# Patient Record
Sex: MALE
[De-identification: names, ages, dates, MRNs, and addresses within clinical notes are randomized per-mention and may not be internally consistent; named-entity substitution may affect disease eponyms.]

---

## 2022-09-22 PROBLEM — Z00.00 ENCOUNTER FOR PREVENTIVE HEALTH EXAMINATION: Status: ACTIVE | Noted: 2022-09-22

## 2022-09-26 ENCOUNTER — APPOINTMENT (OUTPATIENT)
Dept: SURGERY | Facility: CLINIC | Age: 32
End: 2022-09-26

## 2022-09-26 VITALS
HEIGHT: 71 IN | TEMPERATURE: 97.1 F | OXYGEN SATURATION: 98 % | DIASTOLIC BLOOD PRESSURE: 72 MMHG | SYSTOLIC BLOOD PRESSURE: 121 MMHG | BODY MASS INDEX: 24.64 KG/M2 | WEIGHT: 176 LBS | HEART RATE: 53 BPM

## 2022-09-26 PROCEDURE — 99204 OFFICE O/P NEW MOD 45 MIN: CPT

## 2022-09-26 NOTE — ASSESSMENT
[FreeTextEntry1] : Mr. Booth is a 32-year-old man with a right inguinal hernia, possible left inguinal hernia, and umbilical hernia.  We will plan for a robotic-assisted right, possible bilateral inguinal hernia repair with mesh and umbilical hernia repair on October 27, 2022.

## 2022-09-26 NOTE — HISTORY OF PRESENT ILLNESS
[de-identified] : Mr. Booth presented today for evaluation and management of a right inguinal hernia.  He stated the hernia has been present for approximately 4 months.  He first noticed the hernia as a bulge after working out/lifting.  He denied significant pain of the hernia.  He stated the hernia is not significantly enlarging, although it seems to be popping out more frequently than it was when he first noticed it.

## 2022-09-26 NOTE — PHYSICAL EXAM
[Calm] : calm [de-identified] : NAD, comfortable [de-identified] : NCAT, no scleral icterus [de-identified] : +BS soft NT ND.  No hepatosplenomegaly.  Small umbilical hernia, reducible and non-tender. [de-identified] : Reducible right inguinal hernia.  Possible left inguinal hernia. [de-identified] : No clubbing, cyanosis, or edema. [de-identified] : Warm, dry. [de-identified] : A&Ox3

## 2022-09-26 NOTE — CONSULT LETTER
[FreeTextEntry1] : 2022\par \par \par \par Efra Saldaña M.D.\par [address]\par [place] [address]\par Telephone #: [phone]\par \par \par Re: Scotty Booth\par : 1990\par \par \par Dear Dr. Saldaña:\par \par I had the opportunity to see Mr. Booth today for evaluation and management of a right inguinal hernia.  He stated the hernia has been present for approximately 4 months.  He first noticed the hernia as a bulge after working out/lifting.  He denied significant pain of the hernia.  He stated the hernia is not significantly enlarging, although it seems to be popping out more frequently than it was when he first noticed it.\par \par On physical examination, his height is 5 feet 11 inches, his weight is 176 pounds, and BMI is 24.55.  His temperature is 97.1 °F, blood pressure is 121/72, heart rate is 53, and O2 saturation is 98% on room air.  In general, he is a well-dressed well-nourished man who appears his stated age and is in no acute distress.  He is calm, alert and oriented x3. HEENT exam demonstrates a normocephalic atraumatic appearance with no scleral icterus.  His abdomen has audible bowel sounds, is soft, non-tender, and non-distended. There is a small umbilical hernia that is reducible and non-tender.  His extremities are warm and dry without clubbing, cyanosis or edema.  Bilateral groin examination demonstrates a reducible right inguinal hernia, and a possible left inguinal hernia with Valsalva maneuver.\par \par In summary, Mr. Booth is a 32-year-old man with a right inguinal hernia, possible left inguinal hernia, and umbilical hernia.  We will plan for a robotic-assisted right, possible bilateral inguinal hernia repair with mesh and umbilical hernia repair on 2022.\par \par Thank you for the opportunity to care for this patient. Please do not hesitate to contact me in the event that you have any questions or concerns about the care of this patient.\par \par Sincerely,\par \par \par \par Debbie Franco M.D.

## 2022-09-26 NOTE — REASON FOR VISIT
[Consultation] : a consultation visit [FreeTextEntry1] : Consultation requested by: Dr. Efra Saldaña

## 2022-10-07 ENCOUNTER — OUTPATIENT (OUTPATIENT)
Dept: OUTPATIENT SERVICES | Facility: HOSPITAL | Age: 32
LOS: 1 days | End: 2022-10-07
Payer: COMMERCIAL

## 2022-10-07 DIAGNOSIS — Z01.818 ENCOUNTER FOR OTHER PREPROCEDURAL EXAMINATION: ICD-10-CM

## 2022-10-07 PROCEDURE — 71046 X-RAY EXAM CHEST 2 VIEWS: CPT | Mod: 26

## 2022-10-07 PROCEDURE — 93005 ELECTROCARDIOGRAM TRACING: CPT

## 2022-10-07 PROCEDURE — 93010 ELECTROCARDIOGRAM REPORT: CPT

## 2022-10-07 PROCEDURE — 71046 X-RAY EXAM CHEST 2 VIEWS: CPT

## 2022-10-11 ENCOUNTER — TRANSCRIPTION ENCOUNTER (OUTPATIENT)
Age: 32
End: 2022-10-11

## 2022-10-24 ENCOUNTER — LABORATORY RESULT (OUTPATIENT)
Age: 32
End: 2022-10-24

## 2022-10-26 ENCOUNTER — TRANSCRIPTION ENCOUNTER (OUTPATIENT)
Age: 32
End: 2022-10-26

## 2022-10-26 RX ORDER — ACETAMINOPHEN 500 MG
1000 TABLET ORAL ONCE
Refills: 0 | Status: COMPLETED | OUTPATIENT
Start: 2022-10-27 | End: 2022-10-27

## 2022-10-26 RX ORDER — APREPITANT 80 MG/1
40 CAPSULE ORAL ONCE
Refills: 0 | Status: COMPLETED | OUTPATIENT
Start: 2022-10-27 | End: 2022-10-27

## 2022-10-26 RX ORDER — CHLORHEXIDINE GLUCONATE 213 G/1000ML
1 SOLUTION TOPICAL DAILY
Refills: 0 | Status: DISCONTINUED | OUTPATIENT
Start: 2022-10-27 | End: 2022-10-27

## 2022-10-26 NOTE — ASU PATIENT PROFILE, ADULT - NS PREOP UNDERSTANDS INFO
No solid food, dairy, candy or gum after 9:30pm tonight, water is allowed before 04:30am tomorrow. Pt to bring photo ID/insurance card; no jewelries/contact lens/valuables; no smoking/alcohol consumption/drug use tonight. Escort must be 18yrs or older and must have a photo ID. Address and call back number given./yes

## 2022-10-26 NOTE — ASU PATIENT PROFILE, ADULT - PRO INTERPRETER NEED 2
English
[>50% of Time Spent on Counseling for ____] : Greater than 50% of the encounter time was spent on counseling for [unfilled]

## 2022-10-27 ENCOUNTER — APPOINTMENT (OUTPATIENT)
Dept: SURGERY | Facility: AMBULATORY SURGERY CENTER | Age: 32
End: 2022-10-27

## 2022-10-27 ENCOUNTER — TRANSCRIPTION ENCOUNTER (OUTPATIENT)
Age: 32
End: 2022-10-27

## 2022-10-27 ENCOUNTER — OUTPATIENT (OUTPATIENT)
Dept: OUTPATIENT SERVICES | Facility: HOSPITAL | Age: 32
LOS: 1 days | Discharge: HOME | End: 2022-10-27

## 2022-10-27 ENCOUNTER — RESULT REVIEW (OUTPATIENT)
Age: 32
End: 2022-10-27

## 2022-10-27 VITALS
WEIGHT: 175.71 LBS | RESPIRATION RATE: 16 BRPM | TEMPERATURE: 97 F | HEART RATE: 54 BPM | HEIGHT: 71 IN | SYSTOLIC BLOOD PRESSURE: 132 MMHG | OXYGEN SATURATION: 100 % | DIASTOLIC BLOOD PRESSURE: 78 MMHG

## 2022-10-27 VITALS
OXYGEN SATURATION: 100 % | DIASTOLIC BLOOD PRESSURE: 80 MMHG | TEMPERATURE: 98 F | SYSTOLIC BLOOD PRESSURE: 126 MMHG | RESPIRATION RATE: 16 BRPM | HEART RATE: 67 BPM

## 2022-10-27 DIAGNOSIS — Z98.890 OTHER SPECIFIED POSTPROCEDURAL STATES: Chronic | ICD-10-CM

## 2022-10-27 PROCEDURE — 88302 TISSUE EXAM BY PATHOLOGIST: CPT | Mod: 26

## 2022-10-27 PROCEDURE — 49650 LAP ING HERNIA REPAIR INIT: CPT | Mod: AS,50

## 2022-10-27 PROCEDURE — 49585: CPT | Mod: 59

## 2022-10-27 PROCEDURE — 49585: CPT | Mod: AS

## 2022-10-27 PROCEDURE — 49650 LAP ING HERNIA REPAIR INIT: CPT | Mod: 50

## 2022-10-27 PROCEDURE — S2900 ROBOTIC SURGICAL SYSTEM: CPT

## 2022-10-27 DEVICE — MESH HERNIA INGUINAL 3DMAX LARGE 4 X 6" LEFT: Type: IMPLANTABLE DEVICE | Site: BILATERAL | Status: FUNCTIONAL

## 2022-10-27 DEVICE — MESH HERNIA INGUINAL 3DMAX LARGE 4 X 6" RIGHT: Type: IMPLANTABLE DEVICE | Site: BILATERAL | Status: FUNCTIONAL

## 2022-10-27 RX ORDER — ONDANSETRON 8 MG/1
4 TABLET, FILM COATED ORAL ONCE
Refills: 0 | Status: DISCONTINUED | OUTPATIENT
Start: 2022-10-27 | End: 2022-10-27

## 2022-10-27 RX ORDER — SODIUM CHLORIDE 9 MG/ML
1000 INJECTION, SOLUTION INTRAVENOUS
Refills: 0 | Status: DISCONTINUED | OUTPATIENT
Start: 2022-10-27 | End: 2022-10-27

## 2022-10-27 RX ORDER — FENTANYL CITRATE 50 UG/ML
25 INJECTION INTRAVENOUS
Refills: 0 | Status: DISCONTINUED | OUTPATIENT
Start: 2022-10-27 | End: 2022-10-27

## 2022-10-27 RX ORDER — OXYCODONE HYDROCHLORIDE 5 MG/1
1 TABLET ORAL
Qty: 5 | Refills: 0
Start: 2022-10-27

## 2022-10-27 RX ADMIN — Medication 1000 MILLIGRAM(S): at 06:06

## 2022-10-27 RX ADMIN — SODIUM CHLORIDE 100 MILLILITER(S): 9 INJECTION, SOLUTION INTRAVENOUS at 10:49

## 2022-10-27 RX ADMIN — CHLORHEXIDINE GLUCONATE 1 APPLICATION(S): 213 SOLUTION TOPICAL at 06:00

## 2022-10-27 RX ADMIN — APREPITANT 40 MILLIGRAM(S): 80 CAPSULE ORAL at 06:06

## 2022-10-27 NOTE — BRIEF OPERATIVE NOTE - NSICDXBRIEFPREOP_GEN_ALL_CORE_FT
PRE-OP DIAGNOSIS:  Hernia, inguinal 27-Oct-2022 10:14:27 Right Agustin Hobbs  Umbilical hernia 27-Oct-2022 10:14:44  Agustin Hobbs

## 2022-10-27 NOTE — ASU DISCHARGE PLAN (ADULT/PEDIATRIC) - ASU DC SPECIAL INSTRUCTIONSFT
You may remove the bandaids in 2 days. Do not remove the steri-strips.  You may shower in 2 days after removing the bandaids.   Lowfat diet for at least 4-6 weeks.  For pain take Tylenol or Ibuprofen as directed on the bottle. Take Oxycodone as needed.

## 2022-10-27 NOTE — BRIEF OPERATIVE NOTE - NSICDXBRIEFPOSTOP_GEN_ALL_CORE_FT
POST-OP DIAGNOSIS:  Bilateral inguinal hernia 27-Oct-2022 10:14:51  Agustin Hobbs  Umbilical hernia 27-Oct-2022 10:15:02  Agustin Hobbs

## 2022-10-27 NOTE — ASU DISCHARGE PLAN (ADULT/PEDIATRIC) - CALL YOUR DOCTOR IF YOU HAVE ANY OF THE FOLLOWING:
Pain not relieved by Medications/Fever greater than (need to indicate Fahrenheit or Celsius)/Wound/Surgical Site with redness, or foul smelling discharge or pus/Nausea and vomiting that does not stop/Unable to urinate

## 2022-10-27 NOTE — BRIEF OPERATIVE NOTE - NSICDXBRIEFPROCEDURE_GEN_ALL_CORE_FT
PROCEDURES:  Robot-assisted repair of inguinal hernia 27-Oct-2022 10:14:11  Agustin Hobbs  Open repair of umbilical hernia in adult 27-Oct-2022 10:14:17  Agustin Hobbs

## 2022-10-27 NOTE — PRE-ANESTHESIA EVALUATION ADULT - NSANTHOSAYNRD_GEN_A_CORE
No. LIV screening performed.  STOP BANG Legend: 0-2 = LOW Risk; 3-4 = INTERMEDIATE Risk; 5-8 = HIGH Risk

## 2022-10-28 ENCOUNTER — NON-APPOINTMENT (OUTPATIENT)
Age: 32
End: 2022-10-28

## 2022-11-02 LAB — SURGICAL PATHOLOGY STUDY: SIGNIFICANT CHANGE UP

## 2022-11-08 ENCOUNTER — APPOINTMENT (OUTPATIENT)
Dept: SURGERY | Facility: CLINIC | Age: 32
End: 2022-11-08

## 2022-11-08 VITALS
HEIGHT: 71 IN | WEIGHT: 178 LBS | DIASTOLIC BLOOD PRESSURE: 74 MMHG | BODY MASS INDEX: 24.92 KG/M2 | RESPIRATION RATE: 17 BRPM | OXYGEN SATURATION: 97 % | HEART RATE: 55 BPM | SYSTOLIC BLOOD PRESSURE: 114 MMHG | TEMPERATURE: 98.4 F

## 2022-11-08 DIAGNOSIS — Z83.3 FAMILY HISTORY OF DIABETES MELLITUS: ICD-10-CM

## 2022-11-08 DIAGNOSIS — Z78.9 OTHER SPECIFIED HEALTH STATUS: ICD-10-CM

## 2022-11-08 DIAGNOSIS — Z87.891 PERSONAL HISTORY OF NICOTINE DEPENDENCE: ICD-10-CM

## 2022-11-08 DIAGNOSIS — Z82.61 FAMILY HISTORY OF ARTHRITIS: ICD-10-CM

## 2022-11-08 DIAGNOSIS — Z87.19 PERSONAL HISTORY OF OTHER DISEASES OF THE DIGESTIVE SYSTEM: ICD-10-CM

## 2022-11-08 DIAGNOSIS — Z82.49 FAMILY HISTORY OF ISCHEMIC HEART DISEASE AND OTHER DISEASES OF THE CIRCULATORY SYSTEM: ICD-10-CM

## 2022-11-08 DIAGNOSIS — K42.9 UMBILICAL HERNIA W/OUT OBSTRUCTION OR GANGRENE: ICD-10-CM

## 2022-11-08 DIAGNOSIS — K40.20 BILATERAL INGUINAL HERNIA, W/OUT OBSTRUCTION OR GANGRENE, NOT SPECIFIED AS RECURRENT: ICD-10-CM

## 2022-11-08 PROCEDURE — 99024 POSTOP FOLLOW-UP VISIT: CPT

## 2022-11-08 NOTE — CONSULT LETTER
[FreeTextEntry1] : 2022\par \par \par \par Efra Saldaña M.D.\par [address]\[place] [address]\par Telephone #: [phone]\par \par \par Re: Scotty Booth\par : 1990\par \par \par Dear Dr. Saldaña:\par \par I had the opportunity to see Mr. Booth today for a routine post-operative visit after he underwent a robotic-assisted bilateral inguinal hernia repair with mesh and umbilical hernia repair on 2022.  He is overall feeling well.  He denied fever, chills, nausea, vomiting, diarrhea, or constipation.\par \par On physical examination, his height is 5 feet 11 inches, his weight is 178 pounds, and BMI is 24.83. His temperature is 98.4 °F, blood pressure is 114/74, heart rate is 55, and O2 saturation is 97% on room air. In general, he is a well-dressed well-nourished man who appears his stated age and is in no acute distress. He is calm, alert and oriented x3. HEENT exam demonstrates a normocephalic atraumatic appearance with no scleral icterus. His abdomen is soft, non-tender, and non-distended. The incisions are healing well without erythema or induration.  There is no evidence of a recurrent umbilical hernia or any incisional hernias with Valsalva maneuver.  His extremities are warm and dry without clubbing, cyanosis or edema.  Bilateral groin examination demonstrates no evidence of a recurrent inguinal hernia bilaterally with Valsalva maneuver.  There is some right spermatic cord fullness without any masses appreciated.\par \par The pathology was reviewed, which demonstrated hernia sac (mesothelium-lined fibroadipose tissue).\par \par In summary, Mr. Booth is a 32-year-old man who underwent a robotic-assisted bilateral inguinal hernia repair with mesh and umbilical hernia repair on 2022.  He is recovering as expected and will follow up with me as needed.\par \par Thank you for the opportunity to care for this patient. Please do not hesitate to contact me in the event that you have any questions or concerns about the care of this patient.\par \par Sincerely,\par \par \par \par Debbie Franco M.D.

## 2022-11-08 NOTE — HISTORY OF PRESENT ILLNESS
[de-identified] : Mr. Booth presented previously for evaluation and management of a right inguinal hernia.  He stated the hernia has been present for approximately 4 months.  He first noticed the hernia as a bulge after working out/lifting.  He denied significant pain of the hernia.  He stated the hernia is not significantly enlarging, although it seems to be popping out more frequently than it was when he first noticed it.  He underwent a robotic-assisted bilateral inguinal hernia repair with mesh and umbilical hernia repair on October 27, 2022. [de-identified] : He presented today for a routine post-operative visit.  He is overall feeling well.  He denied fever, chills, nausea, vomiting, diarrhea, or constipation.

## 2022-11-08 NOTE — PHYSICAL EXAM
[Calm] : calm [de-identified] : NAD, comfortable [de-identified] : NCAT, no scleral icterus [de-identified] : soft NT ND.  Incisions healing well without erythema or induration.  No evidence of a recurrent umbilical hernia or any incisional hernias on Valsalva maneuver. [de-identified] : Bilateral groin examination demonstrates no evidence of a recurrent inguinal hernia bilaterally with Valsalva maneuver.  There is some right spermatic cord fullness without any masses appreciated. [de-identified] : No clubbing, cyanosis, or edema. [de-identified] : Warm, dry. [de-identified] : A&Ox3

## 2022-11-08 NOTE — ASSESSMENT
[FreeTextEntry1] : Mr. Booth is a 32-year-old man who underwent a robotic-assisted bilateral inguinal hernia repair with mesh and umbilical hernia repair on October 27, 2022.  He is recovering as expected and will follow up with me as needed.

## 2022-11-08 NOTE — REASON FOR VISIT
[Post Op: _________] : a [unfilled] post op visit [FreeTextEntry1] : He underwent a robotic-assisted bilateral inguinal hernia repair with mesh and umbilical hernia repair on October 27, 2022.

## 2023-03-21 NOTE — ASU PATIENT PROFILE, ADULT - FALL HARM RISK - PT AGE POPULATION HIDDEN
Airway  Performed by: Nae Alicea MD  Authorized by: Nae Alicea MD     Final Airway Type:  Endotracheal airway  Final Endotracheal Airway*:  ETT  ETT Size (mm)*:  7.0  Cuff*:  Regular  Technique Used for Successful ETT Placement:  Video laryngoscopy  Devices/Methods Used in Placement*:  Mask  Intubation Procedure*:  Preoxygenation, ETCO2, Atraumatic, Dentition Unchanged, Pharynx Clear and Rapid Sequence Intubation  Insertion Site:  Oral  Blade Type*:  MAC  Blade size: 4D.  Measured from*:  Lips  Secured at (cm)*:  22  Placement Verified by: auscultation and capnometry    Glottic View*:  1 - full view of glottis  Attempts*:  1   Patient Identified, Procedure confirmed, Emergency equipment available and Safety protocols followed  Location:  OR  Urgency:  Elective  Difficult Airway: No    Indications for Airway Management:  Anesthesia  Mask Difficulty Assessment:  0 - not attempted  Performed By:  Anesthesiologist  Anesthesiologist:  Nae Alicea MD  Start Time: 3/21/2023 4:16 PM     Adult

## 2024-06-28 NOTE — REVIEW OF SYSTEMS
-- DO NOT REPLY / DO NOT REPLY ALL --  -- This inbox is not monitored  -- Message is from [facility] ([facility]) --    Offered Waitlist if Available for the Visit Type? No    Caller is requesting an appointment.    Reason for Appointment Message:  Clinician Schedule is unreleased    Reason for Visit: no appointment for [facility] to offer declined trusted partner comfortable with primary only and having issues with diarrhea ongoing     Is the patient currently scheduled? No    Preferred time to be seen: as soon as possible- open     Caller Information         Type Contact Phone/Fax    06/28/2024 12:14 PM CDT Phone (Incoming) maryam souza (Emergency Contact) [phone]            Alternative phone number: none    Can a detailed message be left?  Yes - Voicemail    Patient has been advised the message will be addressed within 2-3 business days        [Negative] : Heme/Lymph

## (undated) DEVICE — SLV COMPRESSION KNEE MED

## (undated) DEVICE — SUPP ATHLETIC MALE XLG 44-55IN

## (undated) DEVICE — GLV 6.5 PROTEXIS (WHITE)

## (undated) DEVICE — GOWN ROYAL SILK XL

## (undated) DEVICE — DRSG TEGADERM 4X4.75"

## (undated) DEVICE — ELCTR BOVIE TIP BLADE INSULATED 2.75" EDGE

## (undated) DEVICE — XI DRAPE ARM

## (undated) DEVICE — XI ARM FORCEP FENESTRATED BIPOLAR 8MM

## (undated) DEVICE — ELCTR GROUNDING PAD ADULT COVIDIEN

## (undated) DEVICE — DRAPE MAYO STAND 23"

## (undated) DEVICE — SUT VICRYL 2-0 27" SH

## (undated) DEVICE — DRSG SUPPORTER ADULT 3" WAISTBAND LRG

## (undated) DEVICE — XI OBTURATOR OPTICAL BLADELESS 8MM

## (undated) DEVICE — XI SEAL UNIV 5- 8 MM

## (undated) DEVICE — DRSG MASTISOL

## (undated) DEVICE — SUT VICRYL 0 27" UR-6

## (undated) DEVICE — D HELP - CLEARVIEW CLEARIFY SYSTEM

## (undated) DEVICE — WARMING BLANKET LOWER ADULT

## (undated) DEVICE — GLV 7 PROTEXIS (WHITE)

## (undated) DEVICE — SUT PDO 0 1/2 CIRCLE 22MM NDL 20CM

## (undated) DEVICE — SUT VICRYL 2-0 27" UR-6

## (undated) DEVICE — XI DRAPE COLUMN

## (undated) DEVICE — SUT MONOCRYL 4-0 27" PS-2 UNDYED

## (undated) DEVICE — MARKING PEN W RULER

## (undated) DEVICE — DRAPE TOP SHEET 53" X 101"

## (undated) DEVICE — ELCTR BOVIE PENCIL HANDPIECE ROCKER SWITCH 15FT

## (undated) DEVICE — DRSG SUPPORTER ADULT 3" WAISTBAND MED

## (undated) DEVICE — SUT MAXON 0 30" GS-11

## (undated) DEVICE — TROCAR COVIDIEN VERSAONE BLUNT TIP HASSAN 12MM